# Patient Record
Sex: FEMALE | Race: WHITE | NOT HISPANIC OR LATINO | Employment: PART TIME | URBAN - METROPOLITAN AREA
[De-identification: names, ages, dates, MRNs, and addresses within clinical notes are randomized per-mention and may not be internally consistent; named-entity substitution may affect disease eponyms.]

---

## 2022-10-07 ENCOUNTER — APPOINTMENT (OUTPATIENT)
Dept: RADIOLOGY | Facility: IMAGING CENTER | Age: 42
End: 2022-10-07
Attending: NURSE PRACTITIONER
Payer: OTHER MISCELLANEOUS

## 2022-10-07 ENCOUNTER — OCCUPATIONAL MEDICINE (OUTPATIENT)
Dept: URGENT CARE | Facility: CLINIC | Age: 42
End: 2022-10-07
Payer: OTHER MISCELLANEOUS

## 2022-10-07 VITALS
TEMPERATURE: 97.3 F | OXYGEN SATURATION: 98 % | WEIGHT: 147.7 LBS | RESPIRATION RATE: 16 BRPM | HEART RATE: 93 BPM | BODY MASS INDEX: 24.61 KG/M2 | DIASTOLIC BLOOD PRESSURE: 70 MMHG | HEIGHT: 65 IN | SYSTOLIC BLOOD PRESSURE: 124 MMHG

## 2022-10-07 VITALS
OXYGEN SATURATION: 98 % | DIASTOLIC BLOOD PRESSURE: 70 MMHG | WEIGHT: 147.7 LBS | HEART RATE: 93 BPM | SYSTOLIC BLOOD PRESSURE: 124 MMHG | TEMPERATURE: 97.3 F | BODY MASS INDEX: 24.61 KG/M2 | RESPIRATION RATE: 16 BRPM | HEIGHT: 65 IN

## 2022-10-07 DIAGNOSIS — S46.911A STRAIN OF RIGHT UPPER ARM, INITIAL ENCOUNTER: ICD-10-CM

## 2022-10-07 DIAGNOSIS — S86.912A KNEE STRAIN, LEFT, INITIAL ENCOUNTER: ICD-10-CM

## 2022-10-07 DIAGNOSIS — S46.912A STRAIN OF LEFT UPPER ARM, INITIAL ENCOUNTER: ICD-10-CM

## 2022-10-07 PROCEDURE — 99203 OFFICE O/P NEW LOW 30 MIN: CPT | Performed by: NURSE PRACTITIONER

## 2022-10-07 PROCEDURE — 73564 X-RAY EXAM KNEE 4 OR MORE: CPT | Mod: TC,LT | Performed by: NURSE PRACTITIONER

## 2022-10-07 PROCEDURE — 99214 OFFICE O/P EST MOD 30 MIN: CPT | Performed by: NURSE PRACTITIONER

## 2022-10-07 ASSESSMENT — ENCOUNTER SYMPTOMS
DIZZINESS: 0
VOMITING: 0
EYE REDNESS: 0
SHORTNESS OF BREATH: 0
EYE REDNESS: 0
VOMITING: 0
NAUSEA: 0
SORE THROAT: 0
CHILLS: 0
MYALGIAS: 1
MYALGIAS: 0
SHORTNESS OF BREATH: 0
CHILLS: 0
NAUSEA: 0
FEVER: 0
DIZZINESS: 0
FEVER: 0
SORE THROAT: 0

## 2022-10-07 NOTE — LETTER
"EMPLOYEE’S CLAIM FOR COMPENSATION/ REPORT OF INITIAL TREATMENT  FORM C-4    EMPLOYEE’S CLAIM - PROVIDE ALL INFORMATION REQUESTED   First Name  Nathalie Last Name  Virgie Birthdate                    1980                Sex  female Claim Number (Insurer’s Use Only)    Home Address  [address] Age  42 y.o. Height  1.651 m (5' 5\") Weight  67 kg (147 lb 11.2 oz) [facility]     [facility] Zip  [address] Telephone  [phone] (home)    Mailing Address  [address] [place] [address] Zip  [address] Primary Language Spoken  English    Insurer   Third-Party   Fanny Assigned Risk [place]   Employee's Occupation (Job Title) When Injury or Occupational Disease Occurred  [employer] Employee    Employer's Name/Company Name  [employer]  Telephone  [phone]    Office Mail Address (Number and Street)   [address]  Zip  [address]    Date of Injury  9/7/2022               Hours Injury  2:00 PM Date Employer Notified  9/7/2022 Last Day of Work after Injury     or Occupational Disease  9/7/2022 Supervisor to Whom Injury     Reported  Pradip Williamson   Address or Location of Accident (if applicable)  Work [1]   What were you doing at the time of accident? (if applicable)  Lifting multiple barrels of heavy material    How did this injury or occupational disease occur? (Be specific an answer in detail. Use additional sheet if necessary)  repeatedly (team) lifted several barrels of contaminated mud in & out of a truck   If you believe that you have an occupational disease, when did you first have knowledge of the disability and it relationship to your employment?   Witnesses to the Accident  Gerald Beal      Nature of Injury or Occupational Disease  Strain  Part(s) of Body Injured or Affected  Lower Arm (L), Lower Arm (R), Upper Arm (L)    I certify that the above is true and correct " to the best of my knowledge and that I have provided this information in order to obtain the benefits of [place] inclusive or Chapter 617 of [place]RS).  I hereby authorize any physician, chiropractor, surgeon, practitioner, or other person, any hospital, including [facility], any medical service organization, any insurance company, or other institution or organization to release to each other, any medical or other information, including benefits paid or payable, pertinent to this injury or disease, except information relative to diagnosis, treatment and/or counseling for AIDS, psychological conditions, alcohol or controlled substances, for which I must give specific authorization.  A Photostat of this authorization shall be as valid as the original.     Date   Place Employee’s Original or  *Electronic Signature   THIS REPORT MUST BE COMPLETED AND MAILED WITHIN 3 WORKING DAYS OF TREATMENT   Place  [facility]  Name of Facility  [facility]   Date  10/7/2022 Diagnosis and Description of Injury or Occupational Disease  (S46.912A) Strain of left upper arm, initial encounter  (S46.911A) Strain of right upper arm, initial encounter Is there evidence the injured employee was under the influence of alcohol and/or another controlled substance at the time of accident?  ? No ? Yes (if yes, please explain)    Hour  3:53 PM   Diagnoses of Strain of left upper arm, initial encounter and Strain of right upper arm, initial encounter were pertinent to this visit. No   Treatment  Encouraged to rest, ice, gentle range of motion, stretching, Tylenol Motrin as needed for pain.  Will place referral for physical therapy.  At this time we will transfer care to occupational medicine as patient will follow-up with Workmen's Comp. when she returns back to [place] for further treatment.  Will place patient on temporary work  restrictions.  Have you advised the patient to remain off work five days or     more?    X-Ray Findings      ? Yes Indicate dates:   From   To      From information given by the employee, together with medical evidence, can        you directly connect this injury or occupational disease as job incurred?    Comments:Indeterminate; repetitive use injury ? No If no, is the injured employee capable of:  ? full duty  No ? modified duty  Yes   Is additional medical care by a physician indicated?  Yes If Modified Duty, Specify any Limitations / Restrictions  No pushing, pulling, weight restrictions less than 10 pounds   Do you know of any previous injury or disease contributing to this condition or occupational disease?  ? Yes ? No (Explain if yes)                          No   Date  10/7/2022 Print Health Care Provider's   NADEEN Thapa I certify the employer’s copy of  this form was mailed on:   Address  [address] [place] [address] Insurer’s Use Only     [facility]  [address]    Provider’s Tax ID Number  [id number] Telephone  Dept: [phone]             Health Care Provider’s Original or Electronic Signature  e-KEILY Fernando.R.NMadeline Degree (MD,DO, DC,PA-C,APRN)   APRN      * Complete and attach Release of Information (Form C-4A) when injured employee signs C-4 Form electronically  ORIGINAL - TREATING HEALTHCARE PROVIDER PAGE 2 - INSURER/TPA PAGE 3 - EMPLOYER PAGE 4 - EMPLOYEE             Form C-4 (rev.08/21)           BRIEF DESCRIPTION OF RIGHTS AND BENEFITS  (Pursuant to NRS 616C.050)    Notice of Injury or Occupational Disease (Incident Report Form C-1): If an injury or occupational disease (OD) arises out of and in the course of employment, you must provide written notice to your employer as soon as practicable, but no later than 7 days after the accident or OD. Your employer shall maintain a sufficient supply of the required forms.    Claim for Compensation (Form C-4): If medical  "treatment is sought, the form C-4 is available at the place of initial treatment. A completed \"Claim for Compensation\" (Form C-4) must be filed within 90 days after an accident or OD. The treating physician or chiropractor must, within 3 working days after treatment, complete and mail to the employer, the employer's insurer and third-party , the Claim for Compensation.    Medical Treatment: If you require medical treatment for your on-the-job injury or OD, you may be required to select a physician or chiropractor from a list provided by your workers’ compensation insurer, if it has contracted with an Organization for Managed Care (MCO) or Preferred Provider Organization (PPO) or providers of health care. If your employer has not entered into a contract with an MCO or PPO, you may select a physician or chiropractor from the Panel of Physicians and Chiropractors. Any medical costs related to your industrial injury or OD will be paid by your insurer.    Temporary Total Disability (TTD): If your doctor has certified that you are unable to work for a period of at least 5 consecutive days, or 5 cumulative days in a 20-day period, or places restrictions on you that your employer does not accommodate, you may be entitled to TTD compensation.    Temporary Partial Disability (TPD): If the wage you receive upon reemployment is less than the compensation for TTD to which you are entitled, the insurer may be required to pay you TPD compensation to make up the difference. TPD can only be paid for a maximum of 24 months.    Permanent Partial Disability (PPD): When your medical condition is stable and there is an indication of a PPD as a result of your injury or OD, within 30 days, your insurer must arrange for an evaluation by a rating physician or chiropractor to determine the degree of your PPD. The amount of your PPD award depends on the date of injury, the results of the PPD evaluation, your age and " wage.    Permanent Total Disability (PTD): If you are medically certified by a treating physician or chiropractor as permanently and totally disabled and have been granted a PTD status by your insurer, you are entitled to receive monthly benefits not to exceed 66 2/3% of your average monthly wage. The amount of your PTD payments is subject to reduction if you previously received a lump-sum PPD award.    Vocational Rehabilitation Services: You may be eligible for vocational rehabilitation services if you are unable to return to the job due to a permanent physical impairment or permanent restrictions as a result of your injury or occupational disease.    Transportation and Per Quique Reimbursement: You may be eligible for travel expenses and per quique associated with medical treatment.    Reopening: You may be able to reopen your claim if your condition worsens after claim closure.     Appeal Process: If you disagree with a written determination issued by the insurer or the insurer does not respond to your request, you may appeal to the Department of Administration, , by following the instructions contained in your determination letter. You must appeal the determination within 70 days from the date of the determination letter at [address], [address], [place], [place] [address], or [address], [place] [address]. If you disagree with the  decision, you may appeal to the Department of Administration, . You must file your appeal within 30 days from the date of the  decision letter at [address], [address], [place] [address], or [address] [facility] [facility] [address], [place] [address]. If you disagree with a decision of an , you may file a petition for judicial review with the District Court. You must do so within 30 days of the Appeal Officer’s decision. You may be represented by an   at your own expense or you may contact the NA for possible representation.    [place]  for Injured Workers (NAIW): If you disagree with a  decision, you may request that NAIW represent you without charge at an  Hearing. For information regarding denial of benefits, you may contact the NA at: [address] [facility], [address], [place] [address], [phone], or [address], [address], [place] [address], [phone]    To File a Complaint with the Division: If you wish to file a complaint with the  of the Division of Industrial Relations (DIR),  please contact the Workers’ Compensation Section, [address] [facility], [address], [place], [place] [address], telephone [phone]) [phone], or [address] [facility], [address], [place], [place] [address], telephone [phone].    For assistance with Workers’ Compensation Issues: You may contact the [facility] Office for Consumer Health Assistance, [address] [facility], [facility] [address], [place], [place] [address], Toll Free 1-369.196.3511, Web site: http://[facility].[place].gov/Programs/ZACH E-mail: [email or web address]              __________________________________________________________________                                    _________________            Employee Name / Signature                                                                                                                            Date                                                                                                                                                                                                                              D-2 (rev. 10/20)

## 2022-10-07 NOTE — LETTER
"EMPLOYEE’S CLAIM FOR COMPENSATION/ REPORT OF INITIAL TREATMENT  FORM C-4    EMPLOYEE’S CLAIM - PROVIDE ALL INFORMATION REQUESTED   First Name  Nathalie Last Name  Virgie Birthdate                    1980                Sex  female Claim Number (Insurer’s Use Only)    Home Address  [address] Age  42 y.o. Height  1.651 m (5' 5\") Weight  67 kg (147 lb 11.2 oz) [facility]     [facility] Zip  [address] Telephone  [phone] (home)    Mailing Address  [address] [place] [address] Zip  [address] Primary Language Spoken  English    Insurer   Third-Party   Fanny Assigned Risk [place]   Employee's Occupation (Job Title) When Injury or Occupational Disease Occurred  [employer] Employee    Employer's Name/Company Name  [employer]  Telephone  [phone]    Office Mail Address (Number and Street)   [address] [facility]  Zip  [address]    Date of Injury  9/27/2022               Hours Injury  8:30 AM Date Employer Notified  9/27/2022 Last Day of Work after Injury     or Occupational Disease  9/27/2022 Supervisor to Whom Injury     Reported  Pradip Williamson   Address or Location of Accident (if applicable)  Work [1]   What were you doing at the time of accident? (if applicable)  knocked/fell off ramp while holding tools    How did this injury or occupational disease occur? (Be specific an answer in detail. Use additional sheet if necessary)  knocked/fell off a ramp leading into a semi trailer while carrying tools landed on leg on ground   If you believe that you have an occupational disease, when did you first have knowledge of the disability and it relationship to your employment?  n/a Witnesses to the Accident  Jaime Longo      Nature of Injury or Occupational Disease  Contusion  Part(s) of Body Injured or Affected  Knee (L), ,     I certify that the above is true and correct to the best of my " knowledge and that I have provided this information in order to obtain the benefits of [place]’s [place] and [place] Acts ([place]A [place], inclusive or Chapter 617 of NRS).  I hereby authorize any physician, chiropractor, surgeon, practitioner, or other person, any hospital, including [facility] hospital, any medical service organization, any insurance company, or other institution or organization to release to each other, any medical or other information, including benefits paid or payable, pertinent to this injury or disease, except information relative to diagnosis, treatment and/or counseling for AIDS, psychological conditions, alcohol or controlled substances, for which I must give specific authorization.  A Photostat of this authorization shall be as valid as the original.     Date   Place Employee’s Original or  *Electronic Signature   THIS REPORT MUST BE COMPLETED AND MAILED WITHIN 3 WORKING DAYS OF TREATMENT   Place  [facility]  Name of Facility  [facility]   Date  10/7/2022 Diagnosis and Description of Injury or Occupational Disease  (S86.912A) Knee strain, left, initial encounter Is there evidence the injured employee was under the influence of alcohol and/or another controlled substance at the time of accident?  ? No ? Yes (if yes, please explain)    Hour  3:55 PM   The encounter diagnosis was Knee strain, left, initial encounter. No   Treatment  X-ray negative for fracture or dislocation, encouraged to rest, ice, Tyle Motrin as need for pain.  Did provide a knee brace.  Patient will be leaving the [place] area traveling back to [place] patient will follow-up with Workmen's Comp. when she returns back to [place] for follow-up.  Have you advised the patient to remain off work five days or     more?    X-Ray Findings  Negative   ? Yes Indicate dates:   From   To      From information given by the employee, together with medical evidence, can       "  you directly connect this injury or occupational disease as job incurred?  Yes ? No If no, is the injured employee capable of:  ? full duty  No ? modified duty  Yes   Is additional medical care by a physician indicated?  Yes If Modified Duty, Specify any Limitations / Restrictions  No bending, squatting, stooping, climbing, weight restriction less than 10 pounds.   Do you know of any previous injury or disease contributing to this condition or occupational disease?  ? Yes ? No (Explain if yes)                          No   Date  10/7/2022 Print Health Care Provider's   NADEEN Thapa I certify the employer’s copy of  this form was mailed on:   Address  [address] [facility] [address] Insurer’s Use Only     [facility] Zip  [address]    Provider’s Tax ID Number  [id number] Telephone  Dept: [phone]             Health Care Provider’s Original or Electronic Signature  e-KEILY Fernando Degree (MD,DO, DC,PA-C,APRN)   APRN      * Complete and attach Release of Information (Form C-4A) when injured employee signs C-4 Form electronically  ORIGINAL - TREATING HEALTHCARE PROVIDER PAGE 2 - INSURER/TPA PAGE 3 - EMPLOYER PAGE 4 - EMPLOYEE             Form C-4 (rev.08/21)           BRIEF DESCRIPTION OF RIGHTS AND BENEFITS  ([place]ursuant to [place]RS 616C.050)    Notice of Injury or Occupational Disease (Incident Report Form C-1): If an injury or occupational disease (OD) arises out of and in the course of employment, you must provide written notice to your employer as soon as practicable, but no later than 7 days after the accident or OD. Your employer shall maintain a sufficient supply of the required forms.    Claim for Compensation (Form C-4): If medical treatment is sought, the form C-4 is available at the place of initial treatment. A completed \"Claim for Compensation\" (Form C-4) must be filed within 90 days after an accident or OD. The treating physician or chiropractor must, within 3 working days after " treatment, complete and mail to the employer, the employer's insurer and third-party , the Claim for Compensation.    Medical Treatment: If you require medical treatment for your on-the-job injury or OD, you may be required to select a physician or chiropractor from a list provided by your workers’ compensation insurer, if it has contracted with an Organization for Managed Care (MCO) or Preferred Provider Organization (PPO) or providers of health care. If your employer has not entered into a contract with an MCO or PPO, you may select a physician or chiropractor from the Panel of Physicians and Chiropractors. Any medical costs related to your industrial injury or OD will be paid by your insurer.    Temporary Total Disability (TTD): If your doctor has certified that you are unable to work for a period of at least 5 consecutive days, or 5 cumulative days in a 20-day period, or places restrictions on you that your employer does not accommodate, you may be entitled to TTD compensation.    Temporary Partial Disability (TPD): If the wage you receive upon reemployment is less than the compensation for TTD to which you are entitled, the insurer may be required to pay you TPD compensation to make up the difference. TPD can only be paid for a maximum of 24 months.    Permanent Partial Disability (PPD): When your medical condition is stable and there is an indication of a PPD as a result of your injury or OD, within 30 days, your insurer must arrange for an evaluation by a rating physician or chiropractor to determine the degree of your PPD. The amount of your PPD award depends on the date of injury, the results of the PPD evaluation, your age and wage.    Permanent Total Disability (PTD): If you are medically certified by a treating physician or chiropractor as permanently and totally disabled and have been granted a PTD status by your insurer, you are entitled to receive monthly benefits not to exceed 66 2/3% of  your average monthly wage. The amount of your PTD payments is subject to reduction if you previously received a lump-sum PPD award.    Vocational Rehabilitation Services: You may be eligible for vocational rehabilitation services if you are unable to return to the job due to a permanent physical impairment or permanent restrictions as a result of your injury or occupational disease.    Transportation and Per Quique Reimbursement: You may be eligible for travel expenses and per quique associated with medical treatment.    Reopening: You may be able to reopen your claim if your condition worsens after claim closure.     Appeal Process: If you disagree with a written determination issued by the insurer or the insurer does not respond to your request, you may appeal to the Department of Administration, , by following the instructions contained in your determination letter. You must appeal the determination within 70 days from the date of the determination letter at [address], [address], [place], [place] [address], or [address] [facility], [address], [place] [address]. If you disagree with the  decision, you may appeal to the Department of Administration, . You must file your appeal within 30 days from the date of the  decision letter at [address], [place], [place] [address], or [address] [facility], [address], [place], [place] [address]. If you disagree with a decision of an , you may file a petition for judicial review with the District Court. You must do so within 30 days of the Appeal Officer’s decision. You may be represented by an  at your own expense or you may contact the [facility] for possible representation.    [place]  for In[place] Workers ([place]IW): If you disagree with a  decision, you may request that NAIW represent you without charge at an  Hearing. For  information regarding denial of benefits, you may contact the [facility] at: [address], [address], [place] [address], [phone]) [phone], or [address], [address], [place] [address], [phone]    To File a Complaint with the Division: If you wish to file a complaint with the  of the Division of Industrial Relations (DIR),  please contact the Workers’ Compensation Section, [address] [facility], [address], [place], [place] [address], telephone (420) [phone], or [address] [facility] [address] [facility] [address], [address], [place], [place] [address], telephone [phone]) [phone].    For assistance with Workers’ Compensation Issues: You may contact the [facility] Office for Consumer Health Assistance, [address] [facility] [address], [place], [place] [address], Toll Free 1-475.199.5904, Web site: http://[facility].[place].gov/Programs/ZACH E-mail: [email or web address]              __________________________________________________________________                                    _________________            Employee Name / Signature                                                                                                                            Date                                                                                                                                                                                                                              D-2 (rev. 10/20)

## 2022-10-07 NOTE — LETTER
[facility] [place] [address] [place] [address]  Phone:  [phone] - Fax:  [phone]   [facility] Progress Report and Disability Certification  Date of Service: 10/7/2022   No Show:  No  Date / Time of Next Visit: 10/21/2022   Claim Information   Patient Name: Nathalie Garvin  Claim Number:     Employer: [employer]  Date of Injury: 9/7/2022     Insurer / TPA: Fanny Assigned Risk [employer]  ID / SSN:     Occupation: [employer] Employee  Diagnosis: Diagnoses of Strain of left upper arm, initial encounter and Strain of right upper arm, initial encounter were pertinent to this visit.    Medical Information   Related to Industrial Injury?   Comments:Indeterminate; repetitive use injury    Subjective Complaints:  DOI 9/7/22: Patient is a 42-year-old female presents to the urgent care for evaluation of a work-related injury that occurred after she had to repetitively lift heavy barrels of contaminated play[place] mud in and out of a truck.  Patient states that she did this repetitively for approximately 3 days causing significant amount of pain and discomfort in her bilateral upper biceps.  Since date of injury she is continued to have weakness and soreness in the left upper extremity.  States that she is barely able to lift a coffee cup due to the discomfort.  Since date of injury she has been moved to a different area when she is not required to do heavy lifting has been resting.  Has not take any medication for the symptoms.  Patient denies previous injuries to the bilateral upper arms.  Patient does have full range of motion of the bilateral upper extremities.  She denies any numbness or tingling.  Patient is traveling out of the area back to her hometown in [place] where she does have a second job as a .   Objective Findings: Right upper arm:  no gross deformities, skin without erythema, no edema, no ecchymosis. +AROM, no bony tenderness, empty can  test negative, Angel negative, no Lux deformity.    Right elbow: No gross deformities, skin without erythema, no edema, no ecchymosis.+AROM, nontender to palpation,  strength 5 out of 5, sensation intact distally, neurovascular intact.   strength 5 out of 5.  Left upper arm: No gross deformities, skin without erythema, no edema, no ecchymosis. +AROM, muscular tenderness elicited at the bicep region, no Lux deformity.empty can test negative, Angel negative.  Left elbow: No gross deformities, skin without erythema, no edema, no ecchymosis,+AROM,    Pre-Existing Condition(s):     Assessment:   Initial Visit    Status: Additional Care Required  Permanent Disability:No    Plan: Transfer CarePT    Diagnostics:      Comments:       Disability Information   Status: Released to Restricted Duty    From:  10/7/2022  Through: 10/21/2022 Restrictions are: Temporary   Physical Restrictions   Sitting:    Standing:    Stooping:    Bending:      Squatting:    Walking:    Climbing:    Pushing:      Pullin hrs/day Other:    Reaching Above Shoulder (L):   Reaching Above Shoulder (R):       Reaching Below Shoulder (L):    Reaching Below Shoulder (R):      Not to exceed Weight Limits   Carrying(hrs):   Weight Limit(lb): < or = to 10 pounds Lifting(hrs):   Weight  Limit(lb):     Comments: Encouraged to rest, ice, gentle range of motion, stretching, Tylenol Motrin as needed for pain.  Will place referral for physical therapy.  At this time we will transfer care to occupational medicine as patient will follow-up with Workmen's Comp. when she returns back to [place] for further treatment.  Will place patient on temporary work restrictions.    Repetitive Actions   Hands: i.e. Fine Manipulations from Grasping:     Feet: i.e. Operating Foot Controls:     Driving / Operate Machinery:     Health Care Provider’s Original or Electronic Signature  NADEEN Thapa Health Care Provider’s Original or Electronic  Signature    Mark Rios MD         Clinic Name / Location: [address]  [place] [address] Clinic Phone Number: Dept: [phone]   Appointment Time: 3:00 Pm Visit Start Time: 3:53 PM   Check-In Time:  3:36 Pm Visit Discharge Time:  5:15 PM    Original-Treating Physician or Chiropractor    Page 2-Insurer/TPA    Page 3-Employer    Page 4-Employee

## 2022-10-07 NOTE — LETTER
[facility] [place] [id number] [place] [address]  Phone:  [phone] - Fax:  [phone]   [facility] Progress Report and Disability Certification  Date of Service: 10/7/2022   No Show:  No  Date / Time of Next Visit: 10/21/2022   Claim Information   Patient Name: Nathalie Garvin  Claim Number:     Employer: [employer]  Date of Injury: 2022     Insurer / TPA: Fanny Assigned Risk [employer]  ID / SSN:     Occupation: [employer] Employee  Diagnosis: The encounter diagnosis was Knee strain, left, initial encounter.    Medical Information   Related to Industrial Injury? Yes    Subjective Complaints:  DOI 22: Patient is a 42-year-old female presents urgent care for evaluation of a left knee injury that occurred while she was carrying tools down a semiloading ramp and was knocked off by a coworker falling to the ground landing on her left knee.  She had pain immediately and has had persistent pain since.  She is able to ambulate with discomfort.  Does have full range of motion, minimal amount of swelling.  She is not take any medication for the symptoms.  Patient denies previous injury to the left knee.  Denies chronic left knee pain.  Patient does have a second job as a .   Objective Findings: Elft knee: No gross deformities, skin without erythema, no edema, no ecchymosis.  Tenderness palpation to the medial lateral aspects, Lachman's negative, valgus and vargus negative. +AROM, DTRs +2, gait normal.   Pre-Existing Condition(s):     Assessment:   Initial Visit    Status: Additional Care Required  Permanent Disability:No    Plan: Transfer Care    Diagnostics: X-ray    Comments:     No radiographic evidence of acute traumatic injury.    Disability Information   Status: Released to Restricted Duty    From:  10/7/2022  Through: 10/21/2022 Restrictions are: Temporary   Physical Restrictions   Sitting:    Standing:    Stooping:    Bendin  hrs/day   Squattin hrs/day Walking:    Climbin hrs/day Pushing:      Pulling:    Other:    Reaching Above Shoulder (L):   Reaching Above Shoulder (R):       Reaching Below Shoulder (L):    Reaching Below Shoulder (R):      Not to exceed Weight Limits   Carrying(hrs):   Weight Limit(lb): < or = to 10 pounds Lifting(hrs):   Weight  Limit(lb): < or = to 10 pounds   Comments: X-ray negative for fracture or dislocation, encouraged to rest, ice, Tyle Motrin as need for pain.  Did provide a knee brace.  Patient will be leaving the [facility] traveling back to [place] patient will follow-up with Workmen's Comp. when she returns back to [place] for follow-up.    Repetitive Actions   Hands: i.e. Fine Manipulations from Grasping:     Feet: i.e. Operating Foot Controls:     Driving / Operate Machinery:     Health Care Provider’s Original or Electronic Signature  NADEEN Thapa Health Care Provider’s Original or Electronic Signature    Mark Rios MD         Clinic Name / Location: [address] [address] Clinic Phone Number: Dept: [phone]   Appointment Time: 3:30 Pm Visit Start Time: 3:55 PM   Check-In Time:  3:52 Pm Visit Discharge Time:  5:15 PM    Original-Treating Physician or Chiropractor    Page 2-Insurer/TPA    Page 3-Employer    Page 4-Employee

## 2022-10-08 NOTE — PROGRESS NOTES
"Subjective:   Nathalie Garvin  is a 42 y.o. female who presents for Other (Work comp ( both arms weak ) )    DOI 9/7/22: Patient is a 42-year-old female presents to the urgent care for evaluation of a work-related injury that occurred after she had to repetitively lift heavy barrels of contaminated play[place] mud in and out of a truck.  Patient states that she did this repetitively for approximately 3 days causing significant amount of pain and discomfort in her bilateral upper biceps.  Since date of injury she is continued to have weakness and soreness in the left upper extremity.  States that she is barely able to lift a coffee cup due to the discomfort.  Since date of injury she has been moved to a different area when she is not required to do heavy lifting has been resting.  Has not take any medication for the symptoms.  Patient denies previous injuries to the bilateral upper arms.  Patient does have full range of motion of the bilateral upper extremities.  She denies any numbness or tingling.  Patient is traveling out of the area back to her hometown in [place] where she does have a second job as a .   HPI  Review of Systems   Constitutional:  Negative for chills and fever.   HENT:  Negative for sore throat.    Eyes:  Negative for redness.   Respiratory:  Negative for shortness of breath.    Cardiovascular:  Negative for chest pain.   Gastrointestinal:  Negative for nausea and vomiting.   Genitourinary:  Negative for dysuria.   Musculoskeletal:  Positive for myalgias.   Skin:  Negative for rash.   Neurological:  Negative for dizziness.   Allergies   Allergen Reactions    Other Food      Shrimp , artificial berry flavor.      Objective:   /70   Pulse 93   Temp 36.3 °C (97.3 °F) (Temporal)   Resp 16   Ht 1.651 m (5' 5\")   Wt 67 kg (147 lb 11.2 oz)   SpO2 98%   BMI 24.58 kg/m²   Physical Exam  Constitutional:       Appearance: Normal appearance. She is not ill-appearing or toxic-appearing.   HENT:    "   Head: Normocephalic.      Right Ear: External ear normal.      Left Ear: External ear normal.      Nose: Nose normal.      Mouth/Throat:      Lips: Pink.      Mouth: Mucous membranes are moist.   Eyes:      General: Lids are normal.         Right eye: No discharge.         Left eye: No discharge.   Pulmonary:      Effort: Pulmonary effort is normal. No accessory muscle usage or respiratory distress.   Musculoskeletal:         General: Normal range of motion.      Right shoulder: Normal.      Left shoulder: Normal.      Right upper arm: Tenderness present. No swelling, edema or bony tenderness.      Left upper arm: Tenderness present. No swelling, edema or bony tenderness.      Right hand: Normal.      Left hand: Normal.      Cervical back: Full passive range of motion without pain.   Skin:     Coloration: Skin is not pale.   Neurological:      Mental Status: She is alert and oriented to person, place, and time.   Psychiatric:         Mood and Affect: Mood normal.         Thought Content: Thought content normal.     Right upper arm:  no gross deformities, skin without erythema, no edema, no ecchymosis. +AROM, no bony tenderness, empty can test negative, Angel negative, no Lux deformity.    Right elbow: No gross deformities, skin without erythema, no edema, no ecchymosis.+AROM, nontender to palpation,  strength 5 out of 5, sensation intact distally, neurovascular intact.   strength 5 out of 5.  Left upper arm: No gross deformities, skin without erythema, no edema, no ecchymosis. +AROM, muscular tenderness elicited at the bicep region, no Lux deformity.empty can test negative, Angel negative.  Left elbow: No gross deformities, skin without erythema, no edema, no ecchymosis,+AROM,    Assessment/Plan:   1. Strain of left upper arm, initial encounter  - Referral to Physical Therapy    2. Strain of right upper arm, initial encounter  - Referral to Physical Therapy  Encouraged to rest, ice, gentle range  of motion, stretching, Tylenol Motrin as needed for pain.  Will place referral for physical therapy.  At this time we will transfer care to occupational medicine as patient will follow-up with Workmen's Comp. when she returns back to [place] for further treatment.  Will place patient on temporary work restrictions.  Differential diagnosis, natural history, supportive care, and indications for immediate follow-up discussed.

## 2022-10-08 NOTE — PROGRESS NOTES
"Subjective:   Nathalie Garvin  is a 42 y.o. female who presents for Knee Injury (Left x 2 weeks )    DOI 9/27/22: Patient is a 42-year-old female presents urgent care for evaluation of a left knee injury that occurred while she was carrying tools down a semiloading ramp and was knocked off by a coworker falling to the ground landing on her left knee.  She had pain immediately and has had persistent pain since.  She is able to ambulate with discomfort.  Does have full range of motion, minimal amount of swelling.  She is not take any medication for the symptoms.  Patient denies previous injury to the left knee.  Denies chronic left knee pain.  Patient does have a second job as a .   HPI  Review of Systems   Constitutional:  Negative for chills and fever.   HENT:  Negative for sore throat.    Eyes:  Negative for redness.   Respiratory:  Negative for shortness of breath.    Cardiovascular:  Negative for chest pain.   Gastrointestinal:  Negative for nausea and vomiting.   Genitourinary:  Negative for dysuria.   Musculoskeletal:  Positive for joint pain. Negative for myalgias.   Skin:  Negative for rash.   Neurological:  Negative for dizziness.   Allergies   Allergen Reactions    Other Food      Shrimp , artificial berry flavor.      Objective:   /70   Pulse 93   Temp 36.3 °C (97.3 °F) (Temporal)   Resp 16   Ht 1.651 m (5' 5\")   Wt 67 kg (147 lb 11.2 oz)   SpO2 98%   BMI 24.58 kg/m²   Physical Exam  Constitutional:       Appearance: Normal appearance. She is not ill-appearing or toxic-appearing.   HENT:      Head: Normocephalic.      Right Ear: External ear normal.      Left Ear: External ear normal.      Nose: Nose normal.      Mouth/Throat:      Lips: Pink.      Mouth: Mucous membranes are moist.   Eyes:      General: Lids are normal.         Right eye: No discharge.         Left eye: No discharge.   Pulmonary:      Effort: Pulmonary effort is normal. No accessory muscle usage or respiratory " distress.   Musculoskeletal:         General: Normal range of motion.      Cervical back: Full passive range of motion without pain.   Skin:     Coloration: Skin is not pale.   Neurological:      Mental Status: She is alert and oriented to person, place, and time.   Psychiatric:         Mood and Affect: Mood normal.         Thought Content: Thought content normal.     Elft knee: No gross deformities, skin without erythema, no edema, no ecchymosis.  Tenderness palpation to the medial lateral aspects, Lachman's negative, valgus and vargus negative. +AROM, DTRs +2, gait normal.   Assessment/Plan:   1. Knee strain, left, initial encounter  - DX-KNEE COMPLETE 4+ LEFT; Future  I independently reviewed the patient's imaging and agree with the interpretation of the radiologist.      No radiographic evidence of acute traumatic injury.    X-ray negative for fracture or dislocation, encouraged to rest, ice, Tyle Motrin as need for pain.  Did provide a knee brace.  Patient will be leaving the [place] area traveling back to [place] patient will follow-up with Workmen's Comp. when she returns back to [place] for follow-up.    Differential diagnosis, natural history, supportive care, and indications for immediate follow-up discussed.     My total time spent caring for the patient on the day of the encounter was 30 minutes.   This does not include time spent on separately billable procedures/tests.